# Patient Record
Sex: FEMALE | Race: BLACK OR AFRICAN AMERICAN | NOT HISPANIC OR LATINO | Employment: OTHER | URBAN - METROPOLITAN AREA
[De-identification: names, ages, dates, MRNs, and addresses within clinical notes are randomized per-mention and may not be internally consistent; named-entity substitution may affect disease eponyms.]

---

## 2017-03-21 ENCOUNTER — TELEPHONE (OUTPATIENT)
Dept: NEUROLOGY | Facility: CLINIC | Age: 67
End: 2017-03-21

## 2017-03-21 NOTE — TELEPHONE ENCOUNTER
Left message and call back number          Message from Pepper Tam RN sent at 3/15/2017  3:51 PM CDT -----  Contact: Patient [phone]      ----- Message -----     From: Ramya Vargas     Sent: 3/15/2017   2:05 PM       To: [facility] Staff    Patient is calling to reschedule appt with Dr. Mays. Please call.

## 2017-03-23 ENCOUNTER — TELEPHONE (OUTPATIENT)
Dept: NEUROLOGY | Facility: CLINIC | Age: 67
End: 2017-03-23

## 2017-03-23 NOTE — TELEPHONE ENCOUNTER
Called Pt for the second time left message and call back number            Message from Cathryn Carranza sent at 3/22/2017  4:04 PM CDT -----  Contact: self @ [phone]  Pt says she is returning Dominic's call.

## 2017-04-24 ENCOUNTER — TELEPHONE (OUTPATIENT)
Dept: NEUROLOGY | Facility: CLINIC | Age: 67
End: 2017-04-24

## 2017-04-24 NOTE — TELEPHONE ENCOUNTER
----- Message from Christy Gomez LPN sent at 4/24/2017 12:18 PM CDT -----  Contact: pt      ----- Message -----     From: Kajal Umanzor     Sent: 4/24/2017  12:06 PM       To: [facility] Staff    Pt is calling Nurse staff regarding a requesting to be seen today or tomorrow. Patient states that the pt is really in pain and may be affecting pt heart. Pt was referral by pt primary Doctor .  Pt call back [phone] or [phone] to be advised. thanks

## 2019-04-03 LAB
CHOLEST SERPL-MSCNC: 159 MG/DL (ref 0–200)
HDLC SERPL-MCNC: 88 MG/DL (ref 35–70)
LDLC SERPL CALC-MCNC: 56 MG/DL (ref 0–100)
TRIGL SERPL-MCNC: 73 MG/DL (ref 0–150)

## 2020-03-04 LAB
CHOLEST SERPL-MSCNC: 170 MG/DL (ref 0–200)
HDLC SERPL-MCNC: 76 MG/DL (ref 35–70)
LDL/HDL RATIO: 2.2 (ref 0–5)
LDLC SERPL CALC-MCNC: 75 MG/DL (ref 0–100)
NON HDL CHOL (CALC): 94 (ref 0–130)
TRIGL SERPL-MCNC: 102 MG/DL (ref 0–150)

## 2021-01-22 ENCOUNTER — TELEPHONE (OUTPATIENT)
Dept: FAMILY MEDICINE | Facility: CLINIC | Age: 71
End: 2021-01-22

## 2021-01-22 PROBLEM — F41.9 ANXIETY: Status: ACTIVE | Noted: 2019-12-04

## 2021-01-22 PROBLEM — F32.A DEPRESSION: Status: ACTIVE | Noted: 2019-12-04

## 2021-01-22 PROBLEM — G43.909 MIGRAINE: Status: ACTIVE | Noted: 2017-04-25

## 2021-01-22 PROBLEM — Z86.19 HISTORY OF ASPERGILLOMA: Status: ACTIVE | Noted: 2019-12-04

## 2021-01-22 PROBLEM — K56.609 SBO (SMALL BOWEL OBSTRUCTION): Status: ACTIVE | Noted: 2019-12-04

## 2021-01-22 PROBLEM — D50.9 MICROCYTIC ANEMIA: Status: ACTIVE | Noted: 2019-12-04

## 2021-01-22 PROBLEM — I10 ESSENTIAL HYPERTENSION: Status: ACTIVE | Noted: 2017-04-25

## 2021-01-22 RX ORDER — ESTRADIOL 10 UG/1
TABLET VAGINAL
COMMUNITY
Start: 2020-12-14

## 2021-01-22 RX ORDER — LISINOPRIL 10 MG/1
5 TABLET ORAL DAILY
COMMUNITY
Start: 2020-11-17

## 2021-01-22 RX ORDER — CYCLOSPORINE 0.5 MG/ML
EMULSION OPHTHALMIC
COMMUNITY
Start: 2020-11-17

## 2021-01-22 RX ORDER — GABAPENTIN 600 MG/1
600 TABLET ORAL 3 TIMES DAILY
COMMUNITY
Start: 2021-01-10

## 2021-01-22 RX ORDER — ERGOCALCIFEROL 1.25 MG/1
50000 CAPSULE ORAL
COMMUNITY
Start: 2020-11-17 | End: 2021-05-06

## 2021-01-22 RX ORDER — FLUTICASONE FUROATE AND VILANTEROL 100; 25 UG/1; UG/1
POWDER RESPIRATORY (INHALATION)
COMMUNITY
End: 2021-02-28

## 2021-01-27 ENCOUNTER — TELEPHONE (OUTPATIENT)
Dept: FAMILY MEDICINE | Facility: CLINIC | Age: 71
End: 2021-01-27

## 2021-01-28 ENCOUNTER — OFFICE VISIT (OUTPATIENT)
Dept: FAMILY MEDICINE | Facility: CLINIC | Age: 71
End: 2021-01-28
Payer: MEDICARE

## 2021-01-28 VITALS
TEMPERATURE: 98 F | SYSTOLIC BLOOD PRESSURE: 124 MMHG | BODY MASS INDEX: 26.43 KG/M2 | RESPIRATION RATE: 16 BRPM | OXYGEN SATURATION: 97 % | HEIGHT: 67 IN | DIASTOLIC BLOOD PRESSURE: 68 MMHG | HEART RATE: 64 BPM | WEIGHT: 168.38 LBS

## 2021-01-28 DIAGNOSIS — G43.719 INTRACTABLE CHRONIC MIGRAINE WITHOUT AURA AND WITHOUT STATUS MIGRAINOSUS: ICD-10-CM

## 2021-01-28 DIAGNOSIS — Z86.39 HISTORY OF VITAMIN D DEFICIENCY: ICD-10-CM

## 2021-01-28 DIAGNOSIS — M19.071 ARTHROSIS OF MIDFOOT, RIGHT: ICD-10-CM

## 2021-01-28 DIAGNOSIS — R91.1 NODULE OF UPPER LOBE OF LEFT LUNG: Chronic | ICD-10-CM

## 2021-01-28 DIAGNOSIS — E61.1 IRON DEFICIENCY: ICD-10-CM

## 2021-01-28 DIAGNOSIS — I10 ESSENTIAL HYPERTENSION: Chronic | ICD-10-CM

## 2021-01-28 DIAGNOSIS — E83.51 HYPOCALCEMIA: ICD-10-CM

## 2021-01-28 DIAGNOSIS — F33.42 RECURRENT MAJOR DEPRESSIVE DISORDER, IN FULL REMISSION: Chronic | ICD-10-CM

## 2021-01-28 DIAGNOSIS — Z90.710 STATUS POST TOTAL ABDOMINAL HYSTERECTOMY: ICD-10-CM

## 2021-01-28 DIAGNOSIS — M85.671 CYST OF BONE OF RIGHT FOOT: ICD-10-CM

## 2021-01-28 DIAGNOSIS — I48.91 ATRIAL FIBRILLATION, UNSPECIFIED TYPE: ICD-10-CM

## 2021-01-28 DIAGNOSIS — F51.01 PRIMARY INSOMNIA: Chronic | ICD-10-CM

## 2021-01-28 DIAGNOSIS — Z90.3 S/P GASTRECTOMY: Chronic | ICD-10-CM

## 2021-01-28 DIAGNOSIS — R73.01 IMPAIRED FASTING GLUCOSE: ICD-10-CM

## 2021-01-28 DIAGNOSIS — Z86.19 HISTORY OF ASPERGILLOMA: ICD-10-CM

## 2021-01-28 DIAGNOSIS — R79.0 LOW FERRITIN: ICD-10-CM

## 2021-01-28 DIAGNOSIS — E04.1 NODULE OF RIGHT LOBE OF THYROID GLAND: ICD-10-CM

## 2021-01-28 DIAGNOSIS — J32.4 CHRONIC PANSINUSITIS: ICD-10-CM

## 2021-01-28 DIAGNOSIS — Z86.39 HISTORY OF NON ANEMIC VITAMIN B12 DEFICIENCY: ICD-10-CM

## 2021-01-28 DIAGNOSIS — Z76.89 ENCOUNTER TO ESTABLISH CARE: ICD-10-CM

## 2021-01-28 DIAGNOSIS — M54.16 LUMBAR RADICULOPATHY: ICD-10-CM

## 2021-01-28 DIAGNOSIS — Z90.2 S/P LOBECTOMY OF LUNG: ICD-10-CM

## 2021-01-28 DIAGNOSIS — M19.079 1ST MTP ARTHRITIS: ICD-10-CM

## 2021-01-28 DIAGNOSIS — M43.06 LUMBAR SPONDYLOLYSIS: ICD-10-CM

## 2021-01-28 DIAGNOSIS — Q27.8 ABERRANT RIGHT SUBCLAVIAN ARTERY: ICD-10-CM

## 2021-01-28 DIAGNOSIS — Z98.84 HISTORY OF GASTRIC BYPASS: ICD-10-CM

## 2021-01-28 DIAGNOSIS — Z90.2 STATUS POST LOBECTOMY OF LUNG: ICD-10-CM

## 2021-01-28 DIAGNOSIS — Z86.39 HISTORY OF DEHYDRATION: Primary | ICD-10-CM

## 2021-01-28 DIAGNOSIS — D50.9 MICROCYTIC ANEMIA: ICD-10-CM

## 2021-01-28 DIAGNOSIS — M20.11 HALLUX VALGUS OF RIGHT FOOT: ICD-10-CM

## 2021-01-28 DIAGNOSIS — K21.9 GASTROESOPHAGEAL REFLUX DISEASE WITHOUT ESOPHAGITIS: Chronic | ICD-10-CM

## 2021-01-28 DIAGNOSIS — Z79.899 LONG TERM PRESCRIPTION BENZODIAZEPINE USE: ICD-10-CM

## 2021-01-28 DIAGNOSIS — F41.9 ANXIETY: ICD-10-CM

## 2021-01-28 DIAGNOSIS — I25.10 CORONARY ARTERY DISEASE INVOLVING NATIVE CORONARY ARTERY OF NATIVE HEART WITHOUT ANGINA PECTORIS: ICD-10-CM

## 2021-01-28 DIAGNOSIS — J45.20 MILD INTERMITTENT ASTHMA WITHOUT COMPLICATION: ICD-10-CM

## 2021-01-28 PROCEDURE — 99205 OFFICE O/P NEW HI 60 MIN: CPT | Mod: S$PBB,,, | Performed by: INTERNAL MEDICINE

## 2021-01-28 PROCEDURE — 99205 PR OFFICE/OUTPT VISIT, NEW, LEVL V, 60-74 MIN: ICD-10-PCS | Mod: S$PBB,,, | Performed by: INTERNAL MEDICINE

## 2021-01-28 PROCEDURE — 99215 OFFICE O/P EST HI 40 MIN: CPT | Mod: PBBFAC,PO | Performed by: INTERNAL MEDICINE

## 2021-01-28 PROCEDURE — 99999 PR PBB SHADOW E&M-EST. PATIENT-LVL V: CPT | Mod: PBBFAC,,, | Performed by: INTERNAL MEDICINE

## 2021-01-28 PROCEDURE — 99999 PR PBB SHADOW E&M-EST. PATIENT-LVL V: ICD-10-PCS | Mod: PBBFAC,,, | Performed by: INTERNAL MEDICINE

## 2021-02-09 ENCOUNTER — PATIENT OUTREACH (OUTPATIENT)
Dept: ADMINISTRATIVE | Facility: HOSPITAL | Age: 71
End: 2021-02-09

## 2021-02-10 ENCOUNTER — PATIENT OUTREACH (OUTPATIENT)
Dept: ADMINISTRATIVE | Facility: HOSPITAL | Age: 71
End: 2021-02-10

## 2021-02-28 PROBLEM — M85.671 CYST OF BONE OF RIGHT FOOT: Status: ACTIVE | Noted: 2021-02-28

## 2021-02-28 PROBLEM — R79.0 LOW FERRITIN: Status: ACTIVE | Noted: 2021-02-28

## 2021-02-28 PROBLEM — J32.4 CHRONIC PANSINUSITIS: Status: ACTIVE | Noted: 2021-02-28

## 2021-02-28 PROBLEM — Q27.8 ABERRANT RIGHT SUBCLAVIAN ARTERY: Status: ACTIVE | Noted: 2021-02-28

## 2021-02-28 PROBLEM — E83.51 HYPOCALCEMIA: Status: ACTIVE | Noted: 2021-02-28

## 2021-02-28 PROBLEM — Z98.84 HISTORY OF GASTRIC BYPASS: Status: ACTIVE | Noted: 2021-02-28

## 2021-02-28 PROBLEM — Z86.39 HISTORY OF DEHYDRATION: Status: ACTIVE | Noted: 2021-02-28

## 2021-02-28 PROBLEM — Z79.899 LONG TERM PRESCRIPTION BENZODIAZEPINE USE: Chronic | Status: ACTIVE | Noted: 2021-02-28

## 2021-02-28 PROBLEM — I25.10 CORONARY ARTERY DISEASE INVOLVING NATIVE CORONARY ARTERY OF NATIVE HEART WITHOUT ANGINA PECTORIS: Status: ACTIVE | Noted: 2021-02-28

## 2021-02-28 PROBLEM — M19.071 ARTHROSIS OF MIDFOOT, RIGHT: Status: ACTIVE | Noted: 2021-02-28

## 2021-02-28 PROBLEM — F41.9 ANXIETY: Chronic | Status: ACTIVE | Noted: 2019-12-04

## 2021-02-28 PROBLEM — R73.01 IMPAIRED FASTING GLUCOSE: Status: ACTIVE | Noted: 2021-02-28

## 2021-02-28 PROBLEM — M54.16 LUMBAR RADICULOPATHY: Status: ACTIVE | Noted: 2021-02-28

## 2021-02-28 PROBLEM — Z79.899 LONG TERM PRESCRIPTION BENZODIAZEPINE USE: Status: ACTIVE | Noted: 2021-02-28

## 2021-02-28 PROBLEM — M20.11 HALLUX VALGUS OF RIGHT FOOT: Status: ACTIVE | Noted: 2021-02-28

## 2021-02-28 PROBLEM — M43.06 LUMBAR SPONDYLOLYSIS: Status: ACTIVE | Noted: 2021-02-28

## 2021-02-28 PROBLEM — Z86.39 HISTORY OF VITAMIN D DEFICIENCY: Status: ACTIVE | Noted: 2021-02-28

## 2021-02-28 PROBLEM — E61.1 IRON DEFICIENCY: Status: ACTIVE | Noted: 2021-02-28

## 2021-02-28 PROBLEM — Z86.19 HISTORY OF ASPERGILLOMA: Chronic | Status: ACTIVE | Noted: 2019-12-04

## 2021-02-28 PROBLEM — M19.079 1ST MTP ARTHRITIS: Status: ACTIVE | Noted: 2021-02-28

## 2021-02-28 PROBLEM — Z90.710 STATUS POST TOTAL ABDOMINAL HYSTERECTOMY: Chronic | Status: ACTIVE | Noted: 2021-02-28

## 2021-02-28 PROBLEM — E04.1 NODULE OF RIGHT LOBE OF THYROID GLAND: Status: ACTIVE | Noted: 2021-02-28

## 2021-02-28 PROBLEM — F33.42 RECURRENT MAJOR DEPRESSIVE DISORDER, IN FULL REMISSION: Chronic | Status: ACTIVE | Noted: 2019-12-04

## 2021-02-28 PROBLEM — Z90.710 STATUS POST TOTAL ABDOMINAL HYSTERECTOMY: Status: ACTIVE | Noted: 2021-02-28

## 2021-02-28 PROBLEM — I10 ESSENTIAL HYPERTENSION: Chronic | Status: ACTIVE | Noted: 2017-04-25

## 2021-03-02 ENCOUNTER — PATIENT OUTREACH (OUTPATIENT)
Dept: ADMINISTRATIVE | Facility: HOSPITAL | Age: 71
End: 2021-03-02

## 2021-03-02 LAB
CHOLEST SERPL-MSCNC: 151 MG/DL (ref 0–200)
HDLC SERPL-MCNC: 87 MG/DL (ref 35–70)
LDL-C: 51 (ref 0–100)
NON HDL CHOL (CALC): 64 (ref 0–130)
TRIGL SERPL-MCNC: 54 MG/DL (ref 0–150)

## 2021-03-03 ENCOUNTER — OFFICE VISIT (OUTPATIENT)
Dept: FAMILY MEDICINE | Facility: CLINIC | Age: 71
End: 2021-03-03
Payer: MEDICARE

## 2021-03-03 VITALS
BODY MASS INDEX: 27.18 KG/M2 | TEMPERATURE: 98 F | WEIGHT: 173.19 LBS | HEART RATE: 68 BPM | HEIGHT: 67 IN | SYSTOLIC BLOOD PRESSURE: 132 MMHG | RESPIRATION RATE: 16 BRPM | DIASTOLIC BLOOD PRESSURE: 68 MMHG | OXYGEN SATURATION: 99 %

## 2021-03-03 DIAGNOSIS — I25.10 CORONARY ARTERY DISEASE INVOLVING NATIVE CORONARY ARTERY OF NATIVE HEART WITHOUT ANGINA PECTORIS: Chronic | ICD-10-CM

## 2021-03-03 DIAGNOSIS — F51.01 PRIMARY INSOMNIA: Chronic | ICD-10-CM

## 2021-03-03 DIAGNOSIS — R79.0 LOW FERRITIN: ICD-10-CM

## 2021-03-03 DIAGNOSIS — Z98.84 HISTORY OF GASTRIC BYPASS: Chronic | ICD-10-CM

## 2021-03-03 DIAGNOSIS — K21.9 GASTROESOPHAGEAL REFLUX DISEASE WITHOUT ESOPHAGITIS: Chronic | ICD-10-CM

## 2021-03-03 DIAGNOSIS — G43.719 INTRACTABLE CHRONIC MIGRAINE WITHOUT AURA AND WITHOUT STATUS MIGRAINOSUS: Chronic | ICD-10-CM

## 2021-03-03 DIAGNOSIS — Z86.19 HISTORY OF ASPERGILLOMA: Chronic | ICD-10-CM

## 2021-03-03 DIAGNOSIS — I10 ESSENTIAL HYPERTENSION: Chronic | ICD-10-CM

## 2021-03-03 DIAGNOSIS — J45.20 MILD INTERMITTENT ASTHMA WITHOUT COMPLICATION: Chronic | ICD-10-CM

## 2021-03-03 DIAGNOSIS — F33.42 RECURRENT MAJOR DEPRESSIVE DISORDER, IN FULL REMISSION: Chronic | ICD-10-CM

## 2021-03-03 DIAGNOSIS — J32.4 CHRONIC PANSINUSITIS: Chronic | ICD-10-CM

## 2021-03-03 DIAGNOSIS — E61.1 IRON DEFICIENCY: Chronic | ICD-10-CM

## 2021-03-03 DIAGNOSIS — Z90.2 STATUS POST LOBECTOMY OF LUNG: Chronic | ICD-10-CM

## 2021-03-03 DIAGNOSIS — E83.51 HYPOCALCEMIA: ICD-10-CM

## 2021-03-03 DIAGNOSIS — R73.01 IMPAIRED FASTING GLUCOSE: Chronic | ICD-10-CM

## 2021-03-03 DIAGNOSIS — F41.9 ANXIETY: Chronic | ICD-10-CM

## 2021-03-03 DIAGNOSIS — Q27.8 ABERRANT RIGHT SUBCLAVIAN ARTERY: Chronic | ICD-10-CM

## 2021-03-03 DIAGNOSIS — D50.9 MICROCYTIC ANEMIA: ICD-10-CM

## 2021-03-03 DIAGNOSIS — Z86.39 HISTORY OF VITAMIN D DEFICIENCY: Chronic | ICD-10-CM

## 2021-03-03 DIAGNOSIS — M43.06 LUMBAR SPONDYLOLYSIS: Chronic | ICD-10-CM

## 2021-03-03 DIAGNOSIS — Z90.3 S/P GASTRECTOMY: Chronic | ICD-10-CM

## 2021-03-03 DIAGNOSIS — M54.16 LUMBAR RADICULOPATHY: Chronic | ICD-10-CM

## 2021-03-03 DIAGNOSIS — R91.1 NODULE OF UPPER LOBE OF LEFT LUNG: Chronic | ICD-10-CM

## 2021-03-03 DIAGNOSIS — Z86.39 HISTORY OF DEHYDRATION: Chronic | ICD-10-CM

## 2021-03-03 DIAGNOSIS — E04.1 NODULE OF RIGHT LOBE OF THYROID GLAND: Primary | Chronic | ICD-10-CM

## 2021-03-03 DIAGNOSIS — Z79.899 LONG TERM PRESCRIPTION BENZODIAZEPINE USE: Chronic | ICD-10-CM

## 2021-03-03 DIAGNOSIS — Z90.710 STATUS POST TOTAL ABDOMINAL HYSTERECTOMY: Chronic | ICD-10-CM

## 2021-03-03 PROBLEM — M19.071 ARTHROSIS OF MIDFOOT, RIGHT: Chronic | Status: ACTIVE | Noted: 2021-02-28

## 2021-03-03 PROBLEM — M85.671 CYST OF BONE OF RIGHT FOOT: Chronic | Status: ACTIVE | Noted: 2021-02-28

## 2021-03-03 PROBLEM — M20.11 HALLUX VALGUS OF RIGHT FOOT: Chronic | Status: ACTIVE | Noted: 2021-02-28

## 2021-03-03 PROBLEM — M19.079 1ST MTP ARTHRITIS: Chronic | Status: ACTIVE | Noted: 2021-02-28

## 2021-03-03 PROCEDURE — 99999 PR PBB SHADOW E&M-EST. PATIENT-LVL V: CPT | Mod: PBBFAC,,, | Performed by: INTERNAL MEDICINE

## 2021-03-03 PROCEDURE — 99214 PR OFFICE/OUTPT VISIT, EST, LEVL IV, 30-39 MIN: ICD-10-PCS | Mod: S$PBB,,, | Performed by: INTERNAL MEDICINE

## 2021-03-03 PROCEDURE — 99215 OFFICE O/P EST HI 40 MIN: CPT | Mod: PBBFAC,PO | Performed by: INTERNAL MEDICINE

## 2021-03-03 PROCEDURE — 99999 PR PBB SHADOW E&M-EST. PATIENT-LVL V: ICD-10-PCS | Mod: PBBFAC,,, | Performed by: INTERNAL MEDICINE

## 2021-03-03 PROCEDURE — 99214 OFFICE O/P EST MOD 30 MIN: CPT | Mod: S$PBB,,, | Performed by: INTERNAL MEDICINE

## 2021-03-03 RX ORDER — POLYETHYLENE GLYCOL 3350, SODIUM SULFATE, SODIUM CHLORIDE, POTASSIUM CHLORIDE, SODIUM ASCORBATE, AND ASCORBIC ACID 7.5-2.691G
1 KIT ORAL
COMMUNITY
Start: 2021-02-23 | End: 2021-03-03

## 2021-03-03 RX ORDER — PROMETHAZINE HYDROCHLORIDE 25 MG/1
25 TABLET ORAL
COMMUNITY

## 2021-03-25 ENCOUNTER — TELEPHONE (OUTPATIENT)
Dept: RADIOLOGY | Facility: HOSPITAL | Age: 71
End: 2021-03-25

## 2021-03-26 ENCOUNTER — HOSPITAL ENCOUNTER (OUTPATIENT)
Dept: RADIOLOGY | Facility: HOSPITAL | Age: 71
Discharge: HOME | End: 2021-03-26
Attending: INTERNAL MEDICINE
Payer: MEDICARE

## 2021-03-26 DIAGNOSIS — E04.1 NODULE OF RIGHT LOBE OF THYROID GLAND: ICD-10-CM

## 2021-03-26 PROCEDURE — 76536 US SOFT TISSUE HEAD NECK THYROID: ICD-10-PCS | Mod: 26,,, | Performed by: RADIOLOGY

## 2021-03-26 PROCEDURE — 76536 US EXAM OF HEAD AND NECK: CPT | Mod: TC,PO

## 2021-03-26 PROCEDURE — 76536 US EXAM OF HEAD AND NECK: CPT | Mod: 26,,, | Performed by: RADIOLOGY

## 2021-03-27 PROBLEM — E04.2 MULTINODULAR THYROID: Chronic | Status: ACTIVE | Noted: 2021-02-28

## 2021-04-14 ENCOUNTER — TELEPHONE (OUTPATIENT)
Dept: FAMILY MEDICINE | Facility: CLINIC | Age: 71
End: 2021-04-14

## 2021-04-22 ENCOUNTER — PATIENT OUTREACH (OUTPATIENT)
Dept: ADMINISTRATIVE | Facility: HOSPITAL | Age: 71
End: 2021-04-22

## 2021-04-27 ENCOUNTER — PATIENT OUTREACH (OUTPATIENT)
Dept: ADMINISTRATIVE | Facility: HOSPITAL | Age: 71
End: 2021-04-27

## 2021-05-06 ENCOUNTER — TELEPHONE (OUTPATIENT)
Dept: FAMILY MEDICINE | Facility: CLINIC | Age: 71
End: 2021-05-06

## 2021-05-06 ENCOUNTER — OFFICE VISIT (OUTPATIENT)
Dept: FAMILY MEDICINE | Facility: CLINIC | Age: 71
End: 2021-05-06
Payer: MEDICARE

## 2021-05-06 VITALS
OXYGEN SATURATION: 97 % | SYSTOLIC BLOOD PRESSURE: 118 MMHG | DIASTOLIC BLOOD PRESSURE: 73 MMHG | RESPIRATION RATE: 18 BRPM | BODY MASS INDEX: 26.21 KG/M2 | HEART RATE: 65 BPM | TEMPERATURE: 98 F | WEIGHT: 167 LBS | HEIGHT: 67 IN

## 2021-05-06 DIAGNOSIS — K21.9 GASTROESOPHAGEAL REFLUX DISEASE WITHOUT ESOPHAGITIS: Primary | ICD-10-CM

## 2021-05-06 DIAGNOSIS — M85.671 CYST OF BONE OF RIGHT FOOT: Chronic | ICD-10-CM

## 2021-05-06 DIAGNOSIS — Z90.3 S/P GASTRECTOMY: Chronic | ICD-10-CM

## 2021-05-06 DIAGNOSIS — M19.079 1ST MTP ARTHRITIS: Chronic | ICD-10-CM

## 2021-05-06 DIAGNOSIS — M20.11 HALLUX VALGUS OF RIGHT FOOT: Chronic | ICD-10-CM

## 2021-05-06 DIAGNOSIS — J32.4 CHRONIC PANSINUSITIS: Chronic | ICD-10-CM

## 2021-05-06 DIAGNOSIS — M19.071 ARTHROSIS OF MIDFOOT, RIGHT: Chronic | ICD-10-CM

## 2021-05-06 DIAGNOSIS — E04.2 MULTINODULAR THYROID: Chronic | ICD-10-CM

## 2021-05-06 DIAGNOSIS — Z98.84 HISTORY OF GASTRIC BYPASS: Chronic | ICD-10-CM

## 2021-05-06 DIAGNOSIS — Z71.2 ENCOUNTER TO DISCUSS TEST RESULTS: ICD-10-CM

## 2021-05-06 PROCEDURE — 99214 PR OFFICE/OUTPT VISIT, EST, LEVL IV, 30-39 MIN: ICD-10-PCS | Mod: S$PBB,,, | Performed by: INTERNAL MEDICINE

## 2021-05-06 PROCEDURE — 99999 PR PBB SHADOW E&M-EST. PATIENT-LVL IV: CPT | Mod: PBBFAC,,, | Performed by: INTERNAL MEDICINE

## 2021-05-06 PROCEDURE — 99214 OFFICE O/P EST MOD 30 MIN: CPT | Mod: S$PBB,,, | Performed by: INTERNAL MEDICINE

## 2021-05-06 PROCEDURE — 99999 PR PBB SHADOW E&M-EST. PATIENT-LVL IV: ICD-10-PCS | Mod: PBBFAC,,, | Performed by: INTERNAL MEDICINE

## 2021-05-06 PROCEDURE — 99214 OFFICE O/P EST MOD 30 MIN: CPT | Mod: PBBFAC,PO | Performed by: INTERNAL MEDICINE

## 2021-05-06 RX ORDER — PANTOPRAZOLE SODIUM 40 MG/1
40 TABLET, DELAYED RELEASE ORAL DAILY
Qty: 90 TABLET | Refills: 0 | Status: SHIPPED | OUTPATIENT
Start: 2021-05-06

## 2021-05-06 RX ORDER — ACETAMINOPHEN 500 MG
1 TABLET ORAL DAILY
COMMUNITY

## 2021-05-12 ENCOUNTER — TELEPHONE (OUTPATIENT)
Dept: FAMILY MEDICINE | Facility: CLINIC | Age: 71
End: 2021-05-12

## 2022-04-05 DIAGNOSIS — Z71.89 COMPLEX CARE COORDINATION: ICD-10-CM

## 2022-04-07 ENCOUNTER — TELEPHONE (OUTPATIENT)
Dept: FAMILY MEDICINE | Facility: CLINIC | Age: 72
End: 2022-04-07
Payer: COMMERCIAL

## 2022-04-07 ENCOUNTER — PATIENT OUTREACH (OUTPATIENT)
Dept: ADMINISTRATIVE | Facility: HOSPITAL | Age: 72
End: 2022-04-07
Payer: COMMERCIAL

## 2022-04-07 NOTE — PROGRESS NOTES
Working HTN report:     Called to discuss scheduling appointment and to get updated BP reading. Telephonic reading given. 122/74. Remote BP entered.

## 2022-04-11 ENCOUNTER — PES CALL (OUTPATIENT)
Dept: ADMINISTRATIVE | Facility: CLINIC | Age: 72
End: 2022-04-11
Payer: COMMERCIAL

## 2022-04-30 ENCOUNTER — TELEPHONE ENCOUNTER (OUTPATIENT)
Dept: URBAN - METROPOLITAN AREA CLINIC 121 | Facility: CLINIC | Age: 72
End: 2022-04-30

## 2022-05-01 ENCOUNTER — TELEPHONE ENCOUNTER (OUTPATIENT)
Dept: URBAN - METROPOLITAN AREA CLINIC 121 | Facility: CLINIC | Age: 72
End: 2022-05-01

## 2022-05-01 RX ORDER — ESOMEPRAZOLE MAGNESIUM 40 MG
1 CAP BID CAPSULE,DELAYED RELEASE (ENTERIC COATED) ORAL
Status: ACTIVE | COMMUNITY
Start: 2006-01-16

## 2022-09-14 DIAGNOSIS — Z78.0 MENOPAUSE: ICD-10-CM

## 2022-11-03 DIAGNOSIS — Z71.89 COMPLEX CARE COORDINATION: ICD-10-CM

## 2022-11-07 ENCOUNTER — TELEPHONE (OUTPATIENT)
Dept: ADMINISTRATIVE | Facility: HOSPITAL | Age: 72
End: 2022-11-07
Payer: COMMERCIAL

## 2023-02-13 ENCOUNTER — PES CALL (OUTPATIENT)
Dept: ADMINISTRATIVE | Facility: OTHER | Age: 73
End: 2023-02-13
Payer: COMMERCIAL

## 2023-05-24 ENCOUNTER — PES CALL (OUTPATIENT)
Dept: ADMINISTRATIVE | Facility: CLINIC | Age: 73
End: 2023-05-24
Payer: COMMERCIAL

## 2023-06-03 DIAGNOSIS — Z71.89 COMPLEX CARE COORDINATION: ICD-10-CM

## 2023-09-13 ENCOUNTER — PATIENT OUTREACH (OUTPATIENT)
Dept: ADMINISTRATIVE | Facility: HOSPITAL | Age: 73
End: 2023-09-13
Payer: COMMERCIAL

## 2024-02-25 ENCOUNTER — HOSPITAL ENCOUNTER (EMERGENCY)
Facility: HOSPITAL | Age: 74
Discharge: HOME | End: 2024-02-25
Attending: EMERGENCY MEDICINE
Payer: MEDICARE

## 2024-02-25 VITALS
HEART RATE: 80 BPM | OXYGEN SATURATION: 98 % | WEIGHT: 145 LBS | TEMPERATURE: 98 F | RESPIRATION RATE: 20 BRPM | SYSTOLIC BLOOD PRESSURE: 120 MMHG | BODY MASS INDEX: 22.71 KG/M2 | DIASTOLIC BLOOD PRESSURE: 62 MMHG

## 2024-02-25 DIAGNOSIS — K05.6 PERIODONTAL DISEASE: ICD-10-CM

## 2024-02-25 DIAGNOSIS — K05.10 GINGIVITIS: Primary | ICD-10-CM

## 2024-02-25 PROCEDURE — 99284 EMERGENCY DEPT VISIT MOD MDM: CPT

## 2024-02-25 RX ORDER — AMOXICILLIN AND CLAVULANATE POTASSIUM 875; 125 MG/1; MG/1
1 TABLET, FILM COATED ORAL 2 TIMES DAILY
Qty: 14 TABLET | Refills: 0 | Status: SHIPPED | OUTPATIENT
Start: 2024-02-25

## 2024-02-25 RX ORDER — CHLORHEXIDINE GLUCONATE ORAL RINSE 1.2 MG/ML
15 SOLUTION DENTAL 2 TIMES DAILY
Qty: 473 ML | Refills: 0 | Status: SHIPPED | OUTPATIENT
Start: 2024-02-25 | End: 2024-03-10

## 2024-02-25 NOTE — ED PROVIDER NOTES
Encounter Date: 2/25/2024       History     Chief Complaint   Patient presents with    Dental Pain     Pt reports dental pain x1 day to the lower right jaw.      73-year-old female with a PMHx of AFib, anxiety, HTN, anemia presents to ED with gingiva pain and swelling.  She has experienced similar symptoms in other areas of her mouth in the past requiring dental implants. In the past few weeks she's noticed gum receeding and pain along her front lower teeth. She has not seen her periodontist in the past few months because she recently broke her jaw. Following with [facility] for closed mandibular fracture. She did not require surgery for this fracture.  This morning she experienced intense gingiva pain along her front lower teeth.  She took Tylenol and an unknown medication given to her by a [employer] member with improvement. She denies pain currently. She denies fever and facial swelling.    The history is provided by the patient.     Review of patient's allergies indicates:   Allergen Reactions    Ondansetron Other (See Comments)    Acetaminophen Other (See Comments)     Elevated liver enzymes    Codeine Itching    Lactase Diarrhea    Milk containing products (dairy)     Nsaids (non-steroidal anti-inflammatory drug) Nausea And Vomiting     PT HAS NO STOMACH AND MAY DEVELOP INTESTINAL ULCERS PER PT.      Past Medical History:   Diagnosis Date    Abscess of upper lobe of right lung without pneumonia 5/29/2014    Anxiety     Atrial fibrillation 10/10/2013    Bronchiectasis     Essential hypertension 4/25/2017    Gastroesophageal reflux disease without esophagitis 9/5/2015    Low ferritin 2/28/2021    Microcytic anemia 12/4/2019    Multinodular thyroid 2/28/2021    Nodule of upper lobe of left lung 9/5/2015    Stable over 10 years per patient     Primary insomnia 9/5/2015    SBO (small bowel obstruction) 12/4/2019     Past Surgical History:   Procedure Laterality Date    ABDOMINAL SURGERY      CHOLECYSTECTOMY      GASTRECTOMY       GASTRIC BYPASS      HEMORRHOID SURGERY      LUNG REMOVAL, PARTIAL      OOPHORECTOMY      one ovary remains    TOTAL ABDOMINAL HYSTERECTOMY       Family History   Problem Relation Age of Onset    Seizures Neg Hx      Social History     Tobacco Use    Smoking status: Never    Smokeless tobacco: Never   Substance Use Topics    Alcohol use: No    Drug use: No     Review of Systems   Constitutional:  Negative for fever.   HENT:  Positive for dental problem.        Physical Exam     Initial Vitals [02/25/24 1210]   BP Pulse Resp Temp SpO2   120/62 80 20 98 °F (36.7 °C) 98 %      MAP       --         Physical Exam    Nursing note and vitals reviewed.  Constitutional: She appears well-developed and well-nourished. She is not diaphoretic. No distress.   HENT:   Head: Normocephalic and atraumatic.   Nose: Nose normal.   Mouth/Throat: No trismus in the jaw. Dental caries present. No dental abscesses.   Gingiva receding over lower teeth with gingiva swelling and tenderness across lower teeth (20-29). No facial swelling or Trismus    Eyes: Conjunctivae and EOM are normal.   Neck: Neck supple.   Cardiovascular:  Normal rate.           Pulmonary/Chest: No respiratory distress.   Musculoskeletal:      Cervical back: Neck supple.     Lymphadenopathy:     She has no cervical adenopathy.   Neurological: She is alert and oriented to person, place, and time. Gait normal.   Skin: No rash noted.   Psychiatric: She has a normal mood and affect. Her behavior is normal. Judgment and thought content normal.         ED Course   Procedures  Labs Reviewed - No data to display       Imaging Results    None          Medications - No data to display  Medical Decision Making  73-year-old female with a PMHx of AFib, anxiety, HTN, anemia presents to ED with gingiva pain and swelling. Nontoxic appearing. Hemodynamically stable. Afebrile. Exam as above.     Ddx:  Periodontitis, gingivitis, periapical abscess, pulpitis    She is overall  well-appearing.  She is hemodynamically stable.  She denies pain on presentation.  She has gingival inflammation and tenderness of the lower gums.  Dental decay and gingival erosion noted.  No discrete abscess or ulcerations.  I do not believe further workup is indicated at this time.  Dental resources provided.  Discussed need to see a periodontist soon.  She is agreeable to this plan.  Will cover with Augmentin/peridex for bacterial causes.  Discussed pain control at home. Strict ED precautions given to return immediately for new, worsening, or concerning symptoms      Risk  Prescription drug management.                                      Clinical Impression:  Final diagnoses:  [K05.10] Gingivitis (Primary)  [K05.6] Periodontal disease          ED Disposition Condition    Discharge Stable          ED Prescriptions       Medication Sig Dispense Start Date End Date Auth. Provider    amoxicillin-clavulanate 875-125mg (AUGMENTIN) 875-125 mg per tablet Take 1 tablet by mouth 2 (two) times daily. 14 tablet 2/25/2024 -- Mercy Allan PA-C    chlorhexidine (PERIDEX) 0.12 % solution Use as directed 15 mLs in the mouth or throat 2 (two) times daily. for 14 days 473 mL 2/25/2024 3/10/2024 Mercy Allan PA-C          Follow-up Information    None          Mercy Allan PA-C  02/25/24 [id number]

## 2024-02-25 NOTE — ED TRIAGE NOTES
""Acid reflux is destroying my teeth  ", now she feels like her gums are infected.  And having  a lot of dental pain .Has a "broken jaw" , and has had several follow ups w/ surgeons. Denies chills/ fever.   "

## 2024-02-25 NOTE — ED NOTES
LOC: The patient is awake and alert; oriented x 3 and speaking appropriately.  APPEARANCE: Patient resting comfortably, patient is clean and well groomed  SKIN: warm and dry, normal skin turgor & moist mucus membranes, skin intact, no breakdown noted.  MUSCULOSKELETAL: Patient moving all extremities well, no obvious swelling or deformities noted  RESPIRATORY: Airway is open and patent, \respirations are spontaneous, normal effort and rate  CARDIAC: Patient has a normal rate, no peripheral edema noted, capillary refill < 3 seconds; No complaints of chest pain   ABDOMEN: Soft and non tender to palpation, no distention noted.

## 2024-02-25 NOTE — DISCHARGE INSTRUCTIONS
Recommend Tylenol over-the-counter as needed for pain  Augmentin and Peridex prescribed for infection   Rinse with Peridex 2 times daily.  Do not swallow the solution.  Please follow-up with a periodontist and dentist as soon as possible. resources provided today